# Patient Record
Sex: FEMALE | Race: WHITE | NOT HISPANIC OR LATINO | Employment: FULL TIME | URBAN - METROPOLITAN AREA
[De-identification: names, ages, dates, MRNs, and addresses within clinical notes are randomized per-mention and may not be internally consistent; named-entity substitution may affect disease eponyms.]

---

## 2022-01-25 ENCOUNTER — APPOINTMENT (EMERGENCY)
Dept: RADIOLOGY | Facility: HOSPITAL | Age: 27
End: 2022-01-25
Payer: COMMERCIAL

## 2022-01-25 ENCOUNTER — HOSPITAL ENCOUNTER (EMERGENCY)
Facility: HOSPITAL | Age: 27
Discharge: HOME | End: 2022-01-25
Attending: EMERGENCY MEDICINE | Admitting: EMERGENCY MEDICINE
Payer: COMMERCIAL

## 2022-01-25 VITALS
OXYGEN SATURATION: 100 % | SYSTOLIC BLOOD PRESSURE: 124 MMHG | RESPIRATION RATE: 18 BRPM | TEMPERATURE: 97.7 F | DIASTOLIC BLOOD PRESSURE: 81 MMHG | HEART RATE: 74 BPM

## 2022-01-25 DIAGNOSIS — R10.9 ACUTE LEFT FLANK PAIN: ICD-10-CM

## 2022-01-25 DIAGNOSIS — R03.0 ELEVATED BLOOD PRESSURE READING: ICD-10-CM

## 2022-01-25 DIAGNOSIS — M54.50 ACUTE LOW BACK PAIN: ICD-10-CM

## 2022-01-25 DIAGNOSIS — R11.2 NAUSEA AND VOMITING: ICD-10-CM

## 2022-01-25 DIAGNOSIS — R19.7 DIARRHEA: ICD-10-CM

## 2022-01-25 DIAGNOSIS — D72.829 LEUKOCYTOSIS: ICD-10-CM

## 2022-01-25 DIAGNOSIS — R10.9 ACUTE ABDOMINAL PAIN: ICD-10-CM

## 2022-01-25 DIAGNOSIS — N20.0 NEPHROLITHIASIS: Primary | ICD-10-CM

## 2022-01-25 LAB
ALBUMIN SERPL BCP-MCNC: 4.9 G/DL (ref 3.5–5)
ALP SERPL-CCNC: 73 U/L (ref 46–116)
ALT SERPL W P-5'-P-CCNC: 21 U/L (ref 12–78)
ANION GAP SERPL CALCULATED.3IONS-SCNC: 7 MMOL/L (ref 4–13)
AST SERPL W P-5'-P-CCNC: 12 U/L (ref 5–45)
ATRIAL RATE: 66 BPM
BACTERIA UR QL AUTO: ABNORMAL /HPF
BASOPHILS # BLD AUTO: 0.04 THOUSANDS/ΜL (ref 0–0.1)
BASOPHILS NFR BLD AUTO: 0 % (ref 0–1)
BILIRUB SERPL-MCNC: 0.91 MG/DL (ref 0.2–1)
BILIRUB UR QL STRIP: NEGATIVE
BUN SERPL-MCNC: 10 MG/DL (ref 5–25)
CALCIUM SERPL-MCNC: 10 MG/DL (ref 8.3–10.1)
CHLORIDE SERPL-SCNC: 109 MMOL/L (ref 100–108)
CLARITY UR: CLEAR
CO2 SERPL-SCNC: 23 MMOL/L (ref 21–32)
COLOR UR: YELLOW
CREAT SERPL-MCNC: 0.92 MG/DL (ref 0.6–1.3)
EOSINOPHIL # BLD AUTO: 0.01 THOUSAND/ΜL (ref 0–0.61)
EOSINOPHIL NFR BLD AUTO: 0 % (ref 0–6)
ERYTHROCYTE [DISTWIDTH] IN BLOOD BY AUTOMATED COUNT: 12.7 % (ref 11.6–15.1)
EXT PREG TEST URINE: NEGATIVE
EXT. CONTROL ED NAV: NORMAL
GFR SERPL CREATININE-BSD FRML MDRD: 86 ML/MIN/1.73SQ M
GLUCOSE SERPL-MCNC: 105 MG/DL (ref 65–140)
GLUCOSE UR STRIP-MCNC: NEGATIVE MG/DL
HCT VFR BLD AUTO: 42.1 % (ref 34.8–46.1)
HGB BLD-MCNC: 14.4 G/DL (ref 11.5–15.4)
HGB UR QL STRIP.AUTO: ABNORMAL
IMM GRANULOCYTES # BLD AUTO: 0.05 THOUSAND/UL (ref 0–0.2)
IMM GRANULOCYTES NFR BLD AUTO: 0 % (ref 0–2)
KETONES UR STRIP-MCNC: ABNORMAL MG/DL
LEUKOCYTE ESTERASE UR QL STRIP: NEGATIVE
LYMPHOCYTES # BLD AUTO: 1.01 THOUSANDS/ΜL (ref 0.6–4.47)
LYMPHOCYTES NFR BLD AUTO: 7 % (ref 14–44)
MCH RBC QN AUTO: 28.8 PG (ref 26.8–34.3)
MCHC RBC AUTO-ENTMCNC: 34.2 G/DL (ref 31.4–37.4)
MCV RBC AUTO: 84 FL (ref 82–98)
MONOCYTES # BLD AUTO: 0.64 THOUSAND/ΜL (ref 0.17–1.22)
MONOCYTES NFR BLD AUTO: 4 % (ref 4–12)
NEUTROPHILS # BLD AUTO: 12.8 THOUSANDS/ΜL (ref 1.85–7.62)
NEUTS SEG NFR BLD AUTO: 89 % (ref 43–75)
NITRITE UR QL STRIP: NEGATIVE
NON-SQ EPI CELLS URNS QL MICRO: ABNORMAL /HPF
NRBC BLD AUTO-RTO: 0 /100 WBCS
P AXIS: -7 DEGREES
PH UR STRIP.AUTO: 7 [PH]
PLATELET # BLD AUTO: 286 THOUSANDS/UL (ref 149–390)
PMV BLD AUTO: 10.7 FL (ref 8.9–12.7)
POTASSIUM SERPL-SCNC: 3.6 MMOL/L (ref 3.5–5.3)
PR INTERVAL: 130 MS
PROT SERPL-MCNC: 8.4 G/DL (ref 6.4–8.2)
PROT UR STRIP-MCNC: NEGATIVE MG/DL
QRS AXIS: 63 DEGREES
QRSD INTERVAL: 86 MS
QT INTERVAL: 414 MS
QTC INTERVAL: 434 MS
RBC # BLD AUTO: 5 MILLION/UL (ref 3.81–5.12)
RBC #/AREA URNS AUTO: ABNORMAL /HPF
SODIUM SERPL-SCNC: 139 MMOL/L (ref 136–145)
SP GR UR STRIP.AUTO: 1.02 (ref 1–1.03)
T WAVE AXIS: 51 DEGREES
UROBILINOGEN UR QL STRIP.AUTO: 0.2 E.U./DL
VENTRICULAR RATE: 66 BPM
WBC # BLD AUTO: 14.55 THOUSAND/UL (ref 4.31–10.16)
WBC #/AREA URNS AUTO: ABNORMAL /HPF

## 2022-01-25 PROCEDURE — 93005 ELECTROCARDIOGRAM TRACING: CPT

## 2022-01-25 PROCEDURE — 76770 US EXAM ABDO BACK WALL COMP: CPT | Performed by: EMERGENCY MEDICINE

## 2022-01-25 PROCEDURE — 93010 ELECTROCARDIOGRAM REPORT: CPT | Performed by: INTERNAL MEDICINE

## 2022-01-25 PROCEDURE — 99284 EMERGENCY DEPT VISIT MOD MDM: CPT | Performed by: EMERGENCY MEDICINE

## 2022-01-25 PROCEDURE — 93308 TTE F-UP OR LMTD: CPT | Performed by: EMERGENCY MEDICINE

## 2022-01-25 PROCEDURE — 81001 URINALYSIS AUTO W/SCOPE: CPT | Performed by: EMERGENCY MEDICINE

## 2022-01-25 PROCEDURE — 96361 HYDRATE IV INFUSION ADD-ON: CPT

## 2022-01-25 PROCEDURE — 80053 COMPREHEN METABOLIC PANEL: CPT | Performed by: EMERGENCY MEDICINE

## 2022-01-25 PROCEDURE — G1004 CDSM NDSC: HCPCS

## 2022-01-25 PROCEDURE — 85025 COMPLETE CBC W/AUTO DIFF WBC: CPT | Performed by: EMERGENCY MEDICINE

## 2022-01-25 PROCEDURE — 74177 CT ABD & PELVIS W/CONTRAST: CPT

## 2022-01-25 PROCEDURE — 96376 TX/PRO/DX INJ SAME DRUG ADON: CPT

## 2022-01-25 PROCEDURE — 99284 EMERGENCY DEPT VISIT MOD MDM: CPT

## 2022-01-25 PROCEDURE — 76705 ECHO EXAM OF ABDOMEN: CPT | Performed by: EMERGENCY MEDICINE

## 2022-01-25 PROCEDURE — 96374 THER/PROPH/DIAG INJ IV PUSH: CPT

## 2022-01-25 PROCEDURE — 96375 TX/PRO/DX INJ NEW DRUG ADDON: CPT

## 2022-01-25 PROCEDURE — 36415 COLL VENOUS BLD VENIPUNCTURE: CPT | Performed by: EMERGENCY MEDICINE

## 2022-01-25 PROCEDURE — 81025 URINE PREGNANCY TEST: CPT | Performed by: EMERGENCY MEDICINE

## 2022-01-25 RX ORDER — ONDANSETRON 2 MG/ML
4 INJECTION INTRAMUSCULAR; INTRAVENOUS ONCE
Status: COMPLETED | OUTPATIENT
Start: 2022-01-25 | End: 2022-01-25

## 2022-01-25 RX ORDER — TAMSULOSIN HYDROCHLORIDE 0.4 MG/1
0.4 CAPSULE ORAL
Qty: 3 CAPSULE | Refills: 0 | Status: SHIPPED | OUTPATIENT
Start: 2022-01-25 | End: 2022-01-28

## 2022-01-25 RX ORDER — OXYCODONE HYDROCHLORIDE AND ACETAMINOPHEN 5; 325 MG/1; MG/1
1 TABLET ORAL EVERY 4 HOURS PRN
Qty: 10 TABLET | Refills: 0 | Status: SHIPPED | OUTPATIENT
Start: 2022-01-25 | End: 2022-02-04

## 2022-01-25 RX ORDER — KETOROLAC TROMETHAMINE 30 MG/ML
15 INJECTION, SOLUTION INTRAMUSCULAR; INTRAVENOUS ONCE
Status: COMPLETED | OUTPATIENT
Start: 2022-01-25 | End: 2022-01-25

## 2022-01-25 RX ORDER — ONDANSETRON 4 MG/1
4 TABLET, FILM COATED ORAL EVERY 6 HOURS
Qty: 12 TABLET | Refills: 0 | Status: SHIPPED | OUTPATIENT
Start: 2022-01-25

## 2022-01-25 RX ADMIN — SODIUM CHLORIDE 1000 ML: 0.9 INJECTION, SOLUTION INTRAVENOUS at 17:39

## 2022-01-25 RX ADMIN — IOHEXOL 100 ML: 350 INJECTION, SOLUTION INTRAVENOUS at 17:16

## 2022-01-25 RX ADMIN — KETOROLAC TROMETHAMINE 15 MG: 30 INJECTION, SOLUTION INTRAMUSCULAR at 19:13

## 2022-01-25 RX ADMIN — ONDANSETRON 4 MG: 2 INJECTION INTRAMUSCULAR; INTRAVENOUS at 17:49

## 2022-01-25 RX ADMIN — ONDANSETRON 4 MG: 2 INJECTION INTRAMUSCULAR; INTRAVENOUS at 15:50

## 2022-01-25 RX ADMIN — KETOROLAC TROMETHAMINE 15 MG: 30 INJECTION, SOLUTION INTRAMUSCULAR at 16:03

## 2022-01-25 NOTE — ED PROVIDER NOTES
History  Chief Complaint   Patient presents with    Back Pain     pt reports L mid-back pain now radiating around to L flank, starting around 1000  Pt reports N/V, and SOB     Patient is a 51-year-old female, with a history significant for asthma and ovarian cyst, who presents to the ED today with a five and a half hour history of sudden onset, constant, atraumatic, and progressively worsening pain felt in the left abdomen/left flank/back that is characterized as a searing sensation  Patient also reports nausea, non-bloody vomiting, non-bloody diarrhea  There is no associated history of AFib, recent travel, recent antibiotic use  In regards to the back pain, patient denies trauma, weakness, numbness, saddle anesthesia, incontinence, retention  Movement exacerbates her discomfort and patient has tried Aleve to remit her symptoms; however, she throughout this medication  Patient denies history of similar symptoms  Notably, patient discontinued her oral contraceptive pills 3 months ago  She states that she is currently on her menstrual cycle; however, she states that the bleeding is less than her normal amount (2 pads/day versus 4)  Patient is without other complaints at this time  There is no associated fever, chest pain, urinary symptoms, rash in the distribution of her pain, shortness of breath when there is not a severe exacerbation of pain  - No language barrier    - History obtained from patient  - There are no limitations to the history obtained  - Previous charting was reviewed          None       No past medical history on file  No past surgical history on file  No family history on file  I have reviewed and agree with the history as documented  No existing history information found  No existing history information found    Social History     Tobacco Use    Smoking status: Not on file    Smokeless tobacco: Not on file   Substance Use Topics    Alcohol use: Not on file    Drug use: Not on file        Review of Systems   Constitutional: Negative for fever  HENT: Negative for trouble swallowing  Eyes: Negative for visual disturbance  Respiratory: Positive for shortness of breath (Only with severe pain exacerbations )  Cardiovascular: Negative for chest pain  Gastrointestinal: Positive for abdominal pain, diarrhea, nausea and vomiting  Negative for blood in stool  Endocrine: Negative for polyuria  Genitourinary: Positive for vaginal bleeding  Negative for dysuria  Musculoskeletal: Negative for gait problem  Skin: Positive for rash (Patient reports sensitive skin, that the rash is improving )  Allergic/Immunologic: Positive for environmental allergies  Neurological: Negative for weakness and numbness  Hematological: Negative for adenopathy  Psychiatric/Behavioral: Negative for confusion  All other systems reviewed and are negative  Physical Exam  ED Triage Vitals [01/25/22 1455]   Temperature Pulse Respirations Blood Pressure SpO2   97 7 °F (36 5 °C) 62 20 135/83 100 %      Temp Source Heart Rate Source Patient Position - Orthostatic VS BP Location FiO2 (%)   Tympanic Monitor Sitting Left arm --      Pain Score       10 - Worst Possible Pain             Orthostatic Vital Signs  Vitals:    01/25/22 1455 01/25/22 1702   BP: 135/83 124/81   Pulse: 62 74   Patient Position - Orthostatic VS: Sitting Lying       Physical Exam  Vitals and nursing note reviewed  Constitutional:       General: She is not in acute distress  Appearance: She is not ill-appearing or toxic-appearing  Comments: Patient appears comfortable during my evaluation    HENT:      Head: Normocephalic and atraumatic  Right Ear: External ear normal       Left Ear: External ear normal       Nose: Nose normal  No rhinorrhea  Mouth/Throat:      Mouth: Mucous membranes are moist       Pharynx: Oropharynx is clear  No oropharyngeal exudate or posterior oropharyngeal erythema     Eyes: General:         Right eye: No discharge  Left eye: No discharge  Neck:      Comments: Patient is spontaneously rotating their neck to the left and right during the history and physical exam interaction without difficulty or apparent discomfort    Cardiovascular:      Rate and Rhythm: Normal rate and regular rhythm  Pulses: Normal pulses  Heart sounds: Normal heart sounds  No murmur heard  No friction rub  No gallop  Comments: 2+ Radial  Pulmonary:      Effort: Pulmonary effort is normal  No respiratory distress  Breath sounds: Normal breath sounds  No stridor  No wheezing, rhonchi or rales  Abdominal:      General: Abdomen is flat  There is no distension  Palpations: Abdomen is soft  Tenderness: There is abdominal tenderness (Left lower quadrant, left flank)  There is guarding  There is no right CVA tenderness, left CVA tenderness or rebound  Musculoskeletal:         General: Tenderness present  No deformity  Cervical back: Neck supple  No tenderness  No muscular tenderness  Right lower leg: No edema  Left lower leg: No edema  Comments: No midline C, T, L-spine tenderness to palpation    Tenderness to palpation of the left lower back   Lymphadenopathy:      Cervical: No cervical adenopathy  Skin:     General: Skin is warm and dry  Capillary Refill: Capillary refill takes less than 2 seconds  Findings: Rash (Punctate scabbed over areas, not in distribution of pain) present  Neurological:      Mental Status: She is alert  Comments: Patient is speaking clearly in complete sentences  Patient is answering appropriately and able follow commands  Patient is moving all four extremities spontaneously  No facial droop  Tongue midline  Intact L5 and S1 motor and sensory function bilaterally    No saddle anesthesia   Psychiatric:         Mood and Affect: Mood normal          Behavior: Behavior normal          ED Medications  Medications sodium chloride 0 9 % bolus 1,000 mL (has no administration in time range)   ketorolac (TORADOL) injection 15 mg (15 mg Intravenous Given 1/25/22 1603)   ondansetron (ZOFRAN) injection 4 mg (4 mg Intravenous Given 1/25/22 1550)       Diagnostic Studies  Results Reviewed     Procedure Component Value Units Date/Time    UA w Reflex to Microscopic w Reflex to Culture [[id number]]  (Abnormal) Collected: 01/25/22 1559    Lab Status: Final result Specimen: Urine, Clean Catch Updated: 01/25/22 1701     Color, UA Yellow     Clarity, UA Clear     Specific Gravity, UA 1 025     pH, UA 7 0     Leukocytes, UA Negative     Nitrite, UA Negative     Protein, UA Negative mg/dl      Glucose, UA Negative mg/dl      Ketones, UA 40 (2+) mg/dl      Urobilinogen, UA 0 2 E U /dl      Bilirubin, UA Negative     Blood, UA Large    Urine Microscopic [[id number]] Collected: 01/25/22 1559    Lab Status:  In process Specimen: Urine, Clean Catch Updated: 01/25/22 1701    Comprehensive metabolic panel [[id number]]  (Abnormal) Collected: 01/25/22 1549    Lab Status: Final result Specimen: Blood from Arm, Left Updated: 01/25/22 1637     Sodium 139 mmol/L      Potassium 3 6 mmol/L      Chloride 109 mmol/L      CO2 23 mmol/L      ANION GAP 7 mmol/L      BUN 10 mg/dL      Creatinine 0 92 mg/dL      Glucose 105 mg/dL      Calcium 10 0 mg/dL      AST 12 U/L      ALT 21 U/L      Alkaline Phosphatase 73 U/L      Total Protein 8 4 g/dL      Albumin 4 9 g/dL      Total Bilirubin 0 91 mg/dL      eGFR 86 ml/min/1 73sq m     Narrative:      [place] guidelines for Chronic Kidney Disease (CKD):     Stage 1 with normal or high GFR (GFR > 90 mL/min/1 73 square meters)    Stage 2 Mild CKD (GFR = 60-89 mL/min/1 73 square meters)    Stage 3A Moderate CKD (GFR = 45-59 mL/min/1 73 square meters)    Stage 3B Moderate CKD (GFR = 30-44 mL/min/1 73 square meters)    Stage 4 Severe CKD (GFR = 15-29 mL/min/1 73 square meters)    Stage 5 End Stage CKD (GFR <15 mL/min/1 73 square meters)  Note: GFR calculation is accurate only with a steady state creatinine    CBC and differential [[id number]]  (Abnormal) Collected: 01/25/22 1549    Lab Status: Final result Specimen: Blood from Arm, Left Updated: 01/25/22 1602     WBC 14 55 Thousand/uL      RBC 5 00 Million/uL      Hemoglobin 14 4 g/dL      Hematocrit 42 1 %      MCV 84 fL      MCH 28 8 pg      MCHC 34 2 g/dL      RDW 12 7 %      MPV 10 7 fL      Platelets 070 Thousands/uL      nRBC 0 /100 WBCs      Neutrophils Relative 89 %      Immat GRANS % 0 %      Lymphocytes Relative 7 %      Monocytes Relative 4 %      Eosinophils Relative 0 %      Basophils Relative 0 %      Neutrophils Absolute 12 80 Thousands/µL      Immature Grans Absolute 0 05 Thousand/uL      Lymphocytes Absolute 1 01 Thousands/µL      Monocytes Absolute 0 64 Thousand/µL      Eosinophils Absolute 0 01 Thousand/µL      Basophils Absolute 0 04 Thousands/µL     POCT pregnancy, urine [[id number]]  (Normal) Resulted: 01/25/22 1600    Lab Status: Final result Updated: 01/25/22 1600     EXT PREG TEST UR (Ref: Negative) negative     Control valid                 CT abdomen pelvis with contrast    (Results Pending)         Procedures  POC FAST US    Date/Time: 1/25/2022 4:00 PM  Performed by: Eder Mondragon MD  Authorized by: Eder Mondragon MD     Patient location:  ED  Procedure details:     Exam Type:  Diagnostic    Indications: abdominal pain      Assess for:  Intra-abdominal fluid and pericardial effusion    Technique: FAST      Views obtained:  Heart - Pericardial sac, RUQ - Rebolledo's Pouch, LUQ - Splenorenal space and Suprapubic - Pouch of Tereso    Image quality: diagnostic      Image availability:  Images available in PACS  FAST Findings:     RUQ (Hepatorenal) free fluid: absent      LUQ (Splenorenal) free fluid: absent      Suprapubic free fluid: absent      Cardiac wall motion: identified      Pericardial effusion: absent Interpretation:     Impressions: negative    POC Renal US    Date/Time: 1/25/2022 4:00 PM  Performed by: Herbert Robins MD  Authorized by: Herbert Robins MD     Patient location:  ED  Performed by:  Resident  Other Assisting Provider: Yes (comment)    Procedure details:     Exam Type:  Diagnostic    Indications: flank/back pain      Assessment for:  Bladder volume and suspected hydronephrosis    Views obtained: bladder (transverse and sagittal), left kidney and right kidney      Image quality: diagnostic      Image availability:  Images available in PACS  Findings:     LEFT kidney findings: unremarkable      LEFT hydronephrosis: mild (grade 1)      RIGHT kidney findings: unremarkable      RIGHT hydronephrosis: none      Bladder:  Visualized  Interpretation:     Renal ultrasound impressions: hydronephrosis            ED Course  ED Course as of 01/25/22 1708   Tue Jan 25, 2022   1544 PREGNANCY TEST URINE: negative   1603 WBC(!): 14 55   1608 ECG: Normal rate, regular rhythm, no ectopy, normal axis, no ST elevations/depressions    1625 Discussed US cancellation with Rheba Bent from [phone] Patient signed out prior to final disposition    1707 Ketones, UA(!): 40 (2+)                     PERC Rule for PE      Most Recent Value   PERC Rule for PE    Age >=50 0 Filed at: 01/25/2022 1533   HR >=100 0 Filed at: 01/25/2022 1533   O2 Sat on room air < 95% 0 Filed at: 01/25/2022 1533   History of PE or DVT 0 Filed at: 01/25/2022 1533   Recent trauma or surgery 0 Filed at: 01/25/2022 1533   Hemoptysis 0 Filed at: 01/25/2022 1533   Exogenous estrogen 0 Filed at: 01/25/2022 1533   Unilateral leg swelling 0 Filed at: 01/25/2022 1533   [address] for PE Results 0 Filed at: 01/25/2022 1533                  Wells' Criteria for PE      Most Recent Value   Wells' Criteria for PE    Clinical signs and symptoms of DVT 0 Filed at: 01/25/2022 1533   PE is primary diagnosis or equally likely 0 Filed at: 01/25/2022 1533   HR >100 0 Filed at: 01/25/2022 1533   Immobilization at least 3 days or Surgery in the previous 4 weeks 0 Filed at: 01/25/2022 1533   Previous, objectively diagnosed PE or DVT 0 Filed at: 01/25/2022 1533   Hemoptysis 0 Filed at: 01/25/2022 1533   Malignancy with treatment within 6 months or palliative 0 Filed at: 01/25/2022 1533   Wells' Criteria Total 0 Filed at: 01/25/2022 1533            MDM  Number of Diagnoses or Management Options  Acute abdominal pain  Acute left flank pain  Diarrhea  Elevated blood pressure reading  Nausea and vomiting  Diagnosis management comments: Patient is a 30-year-old female, with a history significant for asthma, who presents to the ED today with a five and a half hour history of sudden onset, constant, atraumatic, and progressively worsening pain felt in the left abdomen/left flank/back that is characterized as a searing sensation  Patient also reports nausea, nonbloody vomiting, diarrhea, decreased menstrual vaginal bleeding  There is no associated history of AFib, recent travel, recent antibiotic use  In regards to the back pain, patient denies trauma, weakness, numbness, saddle anesthesia, incontinence, retention  Patient is without other complaints at this time  There is no associated fever, chest pain, urinary symptoms, rash in the distribution of her pain, shortness of breath when there is not a severe exacerbation of pain  Patient is currently afebrile and hemodynamically stable  Her physical exam is notable for left lower quadrant tenderness to palpation with guarding as well as tenderness palpation of the left lower back  This presentation is concerning for:  Ruptured ovarian cyst, ectopic pregnancy, ovarian torsion, nephrolithiasis  I also considered UTI  Patient is PERC 0 insert wells low risk, low clinical suspicion for PE    Low clinical suspicion cauda equina/conus medullaris/epidural abscess based upon history and physical exam   Investigate with UA, urine pregnancy, CBC, CMP, ECG, US abdomen  Will manage with Zofran, Toradol, further based on workup  Disposition  Final diagnoses:   Acute left flank pain   Acute abdominal pain   Nausea and vomiting   Diarrhea   Elevated blood pressure reading   Leukocytosis   Acute low back pain     Time reflects when diagnosis was documented in both MDM as applicable and the Disposition within this note     Time User Action Codes Description Comment    1/25/2022  3:32 PM Espiridion Peter A Add [R10 9] Acute left flank pain     1/25/2022  3:32 PM Espiridion Peter A Add [R10 9] Acute abdominal pain     1/25/2022  3:32 PM Espiridion Peter A Add [R11 2] Nausea and vomiting     1/25/2022  3:32 PM Espiridion Peter A Add [R19 7] Diarrhea     1/25/2022  3:44 PM Legare, Paula Na A Add [R03 0] Elevated blood pressure reading     1/25/2022  4:03 PM Espiridion Peter Add [L45 440] Leukocytosis     1/25/2022  4:04 PM Legare, Paula Na A Add [M54 50] Acute low back pain       ED Disposition     None      Follow-up Information     Follow up With Specialties Details Why Contact Info Additional [facility] Schedule an appointment as soon as possible for a visit   [address], [address] [facility] [phone]  [address], [address], [address] [place], [place], [address]          Patient's Medications    No medications on file         PDMP Review     None           ED Provider  Attending physically available and evaluated Carolina Joya I managed the patient along with the ED Attending      Electronically Signed by         Eder Mondragon MD  01/25/22 4787       Eder Mondragon MD  01/25/22 6529

## 2022-01-25 NOTE — DISCHARGE INSTRUCTIONS
You were evaluated in the emergency department for: left abdominal and back pain  You can access your current and pending results through [facility]  A radiologist will take a second look at your X-Rays, if you had any, and you will be contacted with any new findings  You should follow-up with your primary care provider, as soon as possible, for re-evaluation  If you do not have a primary care provider, I have referred you to [address] [place] [address]  You will be contacted about scheduling an appointment  Their phone number is also included on this paperwork  Your workup revealed no emergent features at this time; however, many disease processes are dynamic:    Please, return to the emergency department if you experience new or worsening symptoms, fever, chest pain, shortness of breath, difficulty breathing, dizziness, abdominal pain, persistent nausea/vomiting, syncope or passing out, blood in your urine or stool, coughing up blood, leg swelling/pain, urinary retention, bowel or bladder incontinence, numbness between your legs  Additionally, your blood pressure was measured to be high  This is something that you should discuss with your primary care provider and have re-checked within one week  You have a kidney stone  You should take NSAIDs as directed  Use Percocet if he needed for breakthrough pain  Take Zofran for nausea  Take Flomax as directed to help pass the stone  Follow up with Urology

## 2022-01-26 NOTE — ED ATTENDING ATTESTATION
1/25/2022  IJosé Luis MD, saw and evaluated the patient  I have discussed the patient with the resident/non-physician practitioner and agree with the resident's/non-physician practitioner's findings, Plan of Care, and MDM as documented in the resident's/non-physician practitioner's note, except where noted  All available labs and Radiology studies were reviewed  I was present for key portions of any procedure(s) performed by the resident/non-physician practitioner and I was immediately available to provide assistance  At this point I agree with the current assessment done in the Emergency Department  I have conducted an independent evaluation of this patient a history and physical is as follows:  Sudden-onset left flank pain with vomiting  Patient states radiates to her abdomen  Takes her breath away  Never had anything like this before  Patient cannot get comfortable  States pain is worse with movement  Otherwise healthy, but has history of ovarian cyst   Review of systems otherwise -12 systems reviewed  On exam the patient has mild left lower quadrant tenderness, no CVA tenderness, bedside ultrasound with mild hydronephrosis  Impression:  Kidney stone versus ovarian pathology    Will plan to do stone study, urinalysis, and medicate for symptoms  ED Course         Critical Care Time  Procedures